# Patient Record
Sex: FEMALE | Race: WHITE
[De-identification: names, ages, dates, MRNs, and addresses within clinical notes are randomized per-mention and may not be internally consistent; named-entity substitution may affect disease eponyms.]

---

## 2019-12-06 ENCOUNTER — HOSPITAL ENCOUNTER (INPATIENT)
Dept: HOSPITAL 94 - ER | Age: 69
LOS: 3 days | Discharge: HOME | DRG: 373 | End: 2019-12-09
Attending: FAMILY MEDICINE | Admitting: HOSPITALIST
Payer: MEDICARE

## 2019-12-06 VITALS — WEIGHT: 250.53 LBS | HEIGHT: 63 IN | BODY MASS INDEX: 44.39 KG/M2

## 2019-12-06 VITALS — DIASTOLIC BLOOD PRESSURE: 55 MMHG | SYSTOLIC BLOOD PRESSURE: 146 MMHG

## 2019-12-06 VITALS — SYSTOLIC BLOOD PRESSURE: 156 MMHG | DIASTOLIC BLOOD PRESSURE: 60 MMHG

## 2019-12-06 DIAGNOSIS — Z90.49: ICD-10-CM

## 2019-12-06 DIAGNOSIS — E11.9: ICD-10-CM

## 2019-12-06 DIAGNOSIS — Z79.899: ICD-10-CM

## 2019-12-06 DIAGNOSIS — I10: ICD-10-CM

## 2019-12-06 DIAGNOSIS — D69.6: ICD-10-CM

## 2019-12-06 DIAGNOSIS — E87.6: ICD-10-CM

## 2019-12-06 DIAGNOSIS — E78.5: ICD-10-CM

## 2019-12-06 DIAGNOSIS — F32.9: ICD-10-CM

## 2019-12-06 DIAGNOSIS — K21.9: ICD-10-CM

## 2019-12-06 DIAGNOSIS — A04.72: Primary | ICD-10-CM

## 2019-12-06 DIAGNOSIS — E86.0: ICD-10-CM

## 2019-12-06 DIAGNOSIS — Z88.8: ICD-10-CM

## 2019-12-06 DIAGNOSIS — K76.0: ICD-10-CM

## 2019-12-06 DIAGNOSIS — Z79.82: ICD-10-CM

## 2019-12-06 DIAGNOSIS — K74.60: ICD-10-CM

## 2019-12-06 LAB
ALBUMIN SERPL BCP-MCNC: 3.2 G/DL (ref 3.4–5)
ALBUMIN/GLOB SERPL: 1 {RATIO} (ref 1.1–1.5)
ALP SERPL-CCNC: 107 IU/L (ref 46–116)
ALT SERPL W P-5'-P-CCNC: 19 U/L (ref 12–78)
ANION GAP SERPL CALCULATED.3IONS-SCNC: 7 MMOL/L (ref 8–16)
AST SERPL W P-5'-P-CCNC: 21 U/L (ref 10–37)
BACTERIA URNS QL MICRO: (no result) /HPF
BASOPHILS # BLD AUTO: 0 X10'3 (ref 0–0.2)
BASOPHILS NFR BLD AUTO: 0.2 % (ref 0–1)
BILIRUB SERPL-MCNC: 1.1 MG/DL (ref 0.1–1)
BUN SERPL-MCNC: 10 MG/DL (ref 7–18)
BUN/CREAT SERPL: 20.4 (ref 6.6–38)
C DIFF SPECIMEN=DIARRHEA?: (no result)
C DIFF TOX A+B STL QL IA: POSITIVE
C DIFF TOX A+B STL QL: POSITIVE
CALCIUM SERPL-MCNC: 8.4 MG/DL (ref 8.5–10.1)
CHLORIDE SERPL-SCNC: 105 MMOL/L (ref 99–107)
CLARITY UR: (no result)
CO2 SERPL-SCNC: 30.9 MMOL/L (ref 24–32)
COARSE GRAN CASTS URNS QL MICRO: (no result) /LPF
COLOR UR: YELLOW
CREAT SERPL-MCNC: 0.49 MG/DL (ref 0.4–0.9)
DEPRECATED SQUAMOUS URNS QL MICRO: (no result) /LPF
EOSINOPHIL # BLD AUTO: 0 X10'3 (ref 0–0.9)
EOSINOPHIL NFR BLD AUTO: 0.4 % (ref 0–6)
ERYTHROCYTE [DISTWIDTH] IN BLOOD BY AUTOMATED COUNT: 15.1 % (ref 11.5–14.5)
GFR SERPL CREATININE-BSD FRML MDRD: > 90 ML/MIN
GLUCOSE SERPL-MCNC: 133 MG/DL (ref 70–104)
GLUCOSE UR STRIP-MCNC: NEGATIVE MG/DL
HCT VFR BLD AUTO: 41.5 % (ref 35–45)
HGB BLD-MCNC: 14.1 G/DL (ref 12–16)
HGB UR QL STRIP: (no result)
KETONES UR STRIP-MCNC: NEGATIVE MG/DL
LEUKOCYTE ESTERASE UR QL STRIP: NEGATIVE
LIPASE SERPL-CCNC: 72 U/L (ref 73–393)
LYMPHOCYTES # BLD AUTO: 0.8 X10'3 (ref 1.1–4.8)
LYMPHOCYTES NFR BLD AUTO: 8.6 % (ref 21–51)
MCH RBC QN AUTO: 29.2 PG (ref 27–31)
MCHC RBC AUTO-ENTMCNC: 34 G/DL (ref 33–36.5)
MCV RBC AUTO: 85.6 FL (ref 78–98)
MONOCYTES # BLD AUTO: 0.6 X10'3 (ref 0–0.9)
MONOCYTES NFR BLD AUTO: 5.9 % (ref 2–12)
MUCOUS THREADS URNS QL MICRO: (no result) /LPF
NEUTROPHILS # BLD AUTO: 7.9 X10'3 (ref 1.8–7.7)
NEUTROPHILS NFR BLD AUTO: 84.9 % (ref 42–75)
NITRITE UR QL STRIP: NEGATIVE
PH UR STRIP: 6 [PH] (ref 4.8–8)
PLATELET # BLD AUTO: 73 X10'3 (ref 140–440)
PMV BLD AUTO: 9.3 FL (ref 7.4–10.4)
POTASSIUM SERPL-SCNC: 2.7 MMOL/L (ref 3.5–5.1)
PROT SERPL-MCNC: 6.4 G/DL (ref 6.4–8.2)
PROT UR QL STRIP: 30 MG/DL
RBC # BLD AUTO: 4.85 X10'6 (ref 4.2–5.6)
RBC #/AREA URNS HPF: (no result) /HPF (ref 0–2)
SODIUM SERPL-SCNC: 143 MMOL/L (ref 135–145)
SP GR UR STRIP: 1.02 (ref 1–1.03)
TRANS CELLS URNS QL MICRO: (no result) /HPF
URN COLLECT METHOD CLASS: (no result)
UROBILINOGEN UR STRIP-MCNC: 1 E.U/DL (ref 0.2–1)
WBC # BLD AUTO: 9.3 X10'3 (ref 4.5–11)
WBC #/AREA URNS HPF: (no result) /HPF (ref 0–4)

## 2019-12-06 PROCEDURE — 83735 ASSAY OF MAGNESIUM: CPT

## 2019-12-06 PROCEDURE — 87449 NOS EACH ORGANISM AG IA: CPT

## 2019-12-06 PROCEDURE — 82272 OCCULT BLD FECES 1-3 TESTS: CPT

## 2019-12-06 PROCEDURE — 80048 BASIC METABOLIC PNL TOTAL CA: CPT

## 2019-12-06 PROCEDURE — 86900 BLOOD TYPING SEROLOGIC ABO: CPT

## 2019-12-06 PROCEDURE — 86901 BLOOD TYPING SEROLOGIC RH(D): CPT

## 2019-12-06 PROCEDURE — 85610 PROTHROMBIN TIME: CPT

## 2019-12-06 PROCEDURE — 82948 REAGENT STRIP/BLOOD GLUCOSE: CPT

## 2019-12-06 PROCEDURE — 86885 COOMBS TEST INDIRECT QUAL: CPT

## 2019-12-06 PROCEDURE — 84132 ASSAY OF SERUM POTASSIUM: CPT

## 2019-12-06 PROCEDURE — 87081 CULTURE SCREEN ONLY: CPT

## 2019-12-06 PROCEDURE — 99285 EMERGENCY DEPT VISIT HI MDM: CPT

## 2019-12-06 PROCEDURE — 83036 HEMOGLOBIN GLYCOSYLATED A1C: CPT

## 2019-12-06 PROCEDURE — 36415 COLL VENOUS BLD VENIPUNCTURE: CPT

## 2019-12-06 PROCEDURE — 87324 CLOSTRIDIUM AG IA: CPT

## 2019-12-06 PROCEDURE — 85025 COMPLETE CBC W/AUTO DIFF WBC: CPT

## 2019-12-06 PROCEDURE — 83690 ASSAY OF LIPASE: CPT

## 2019-12-06 PROCEDURE — 96365 THER/PROPH/DIAG IV INF INIT: CPT

## 2019-12-06 PROCEDURE — 81001 URINALYSIS AUTO W/SCOPE: CPT

## 2019-12-06 PROCEDURE — 80053 COMPREHEN METABOLIC PANEL: CPT

## 2019-12-06 RX ADMIN — SODIUM CHLORIDE SCH MLS/HR: 9 INJECTION INTRAMUSCULAR; INTRAVENOUS; SUBCUTANEOUS at 13:32

## 2019-12-06 RX ADMIN — POTASSIUM CHLORIDE PRN MEQ: 1500 TABLET, EXTENDED RELEASE ORAL at 15:22

## 2019-12-06 RX ADMIN — VANCOMYCIN HYDROCHLORIDE SCH MG: 500 INJECTION, POWDER, LYOPHILIZED, FOR SOLUTION INTRAVENOUS at 20:04

## 2019-12-06 RX ADMIN — INSULIN GLARGINE SCH UNIT: 100 INJECTION, SOLUTION SUBCUTANEOUS at 21:00

## 2019-12-06 RX ADMIN — VANCOMYCIN HYDROCHLORIDE SCH MG: 500 INJECTION, POWDER, LYOPHILIZED, FOR SOLUTION INTRAVENOUS at 16:33

## 2019-12-06 RX ADMIN — VANCOMYCIN HYDROCHLORIDE SCH MG: 500 INJECTION, POWDER, LYOPHILIZED, FOR SOLUTION INTRAVENOUS at 14:50

## 2019-12-06 RX ADMIN — POTASSIUM CHLORIDE PRN MEQ: 1500 TABLET, EXTENDED RELEASE ORAL at 22:20

## 2019-12-06 NOTE — NUR
Problems reprioritized. Patient report given, questions answered & plan of care reviewed 
with Gabriel HENDRICKSON.

## 2019-12-07 VITALS — DIASTOLIC BLOOD PRESSURE: 63 MMHG | SYSTOLIC BLOOD PRESSURE: 146 MMHG

## 2019-12-07 VITALS — DIASTOLIC BLOOD PRESSURE: 58 MMHG | SYSTOLIC BLOOD PRESSURE: 133 MMHG

## 2019-12-07 VITALS — DIASTOLIC BLOOD PRESSURE: 65 MMHG | SYSTOLIC BLOOD PRESSURE: 149 MMHG

## 2019-12-07 LAB
ALBUMIN SERPL BCP-MCNC: 2.8 G/DL (ref 3.4–5)
ANION GAP SERPL CALCULATED.3IONS-SCNC: 11 MMOL/L (ref 8–16)
BASOPHILS # BLD AUTO: 0 X10'3 (ref 0–0.2)
BASOPHILS NFR BLD AUTO: 0.1 % (ref 0–1)
BUN SERPL-MCNC: 11 MG/DL (ref 7–18)
BUN/CREAT SERPL: 26.8 (ref 6.6–38)
CALCIUM SERPL-MCNC: 8.2 MG/DL (ref 8.5–10.1)
CHLORIDE SERPL-SCNC: 107 MMOL/L (ref 99–107)
CO2 SERPL-SCNC: 24.2 MMOL/L (ref 24–32)
CREAT SERPL-MCNC: 0.41 MG/DL (ref 0.4–0.9)
EOSINOPHIL # BLD AUTO: 0 X10'3 (ref 0–0.9)
EOSINOPHIL NFR BLD AUTO: 0.6 % (ref 0–6)
ERYTHROCYTE [DISTWIDTH] IN BLOOD BY AUTOMATED COUNT: 15 % (ref 11.5–14.5)
GFR SERPL CREATININE-BSD FRML MDRD: > 90 ML/MIN
GLUCOSE SERPL-MCNC: 230 MG/DL (ref 70–104)
HBA1C MFR BLD: 8 % (ref 4.5–6.2)
HCT VFR BLD AUTO: 38.8 % (ref 35–45)
HEMOCCULT STL QL: POSITIVE
HGB BLD-MCNC: 13.3 G/DL (ref 12–16)
LYMPHOCYTES # BLD AUTO: 0.7 X10'3 (ref 1.1–4.8)
LYMPHOCYTES NFR BLD AUTO: 12.1 % (ref 21–51)
MAGNESIUM SERPL-MCNC: 1.7 MG/DL (ref 1.5–2.4)
MCH RBC QN AUTO: 29.6 PG (ref 27–31)
MCHC RBC AUTO-ENTMCNC: 34.2 G/DL (ref 33–36.5)
MCV RBC AUTO: 86.7 FL (ref 78–98)
MONOCYTES # BLD AUTO: 0.4 X10'3 (ref 0–0.9)
MONOCYTES NFR BLD AUTO: 7.2 % (ref 2–12)
NEUTROPHILS # BLD AUTO: 4.9 X10'3 (ref 1.8–7.7)
NEUTROPHILS NFR BLD AUTO: 80 % (ref 42–75)
PLATELET # BLD AUTO: 59 X10'3 (ref 140–440)
PMV BLD AUTO: 9.3 FL (ref 7.4–10.4)
POTASSIUM SERPL-SCNC: 2.9 MMOL/L (ref 3.5–5.1)
RBC # BLD AUTO: 4.47 X10'6 (ref 4.2–5.6)
SODIUM SERPL-SCNC: 142 MMOL/L (ref 135–145)
WBC # BLD AUTO: 6.1 X10'3 (ref 4.5–11)

## 2019-12-07 RX ADMIN — VANCOMYCIN HYDROCHLORIDE SCH MG: 500 INJECTION, POWDER, LYOPHILIZED, FOR SOLUTION INTRAVENOUS at 20:08

## 2019-12-07 RX ADMIN — SODIUM CHLORIDE SCH MLS/HR: 9 INJECTION INTRAMUSCULAR; INTRAVENOUS; SUBCUTANEOUS at 01:28

## 2019-12-07 RX ADMIN — POTASSIUM CHLORIDE PRN MEQ: 1500 TABLET, EXTENDED RELEASE ORAL at 07:12

## 2019-12-07 RX ADMIN — INSULIN LISPRO SCH UNITS: 100 INJECTION, SOLUTION INTRAVENOUS; SUBCUTANEOUS at 09:14

## 2019-12-07 RX ADMIN — VANCOMYCIN HYDROCHLORIDE SCH MG: 500 INJECTION, POWDER, LYOPHILIZED, FOR SOLUTION INTRAVENOUS at 07:10

## 2019-12-07 RX ADMIN — VANCOMYCIN HYDROCHLORIDE SCH MG: 500 INJECTION, POWDER, LYOPHILIZED, FOR SOLUTION INTRAVENOUS at 12:49

## 2019-12-07 RX ADMIN — SODIUM CHLORIDE SCH MLS/HR: 9 INJECTION INTRAMUSCULAR; INTRAVENOUS; SUBCUTANEOUS at 10:31

## 2019-12-07 RX ADMIN — PANTOPRAZOLE SODIUM SCH MG: 40 TABLET, DELAYED RELEASE ORAL at 07:11

## 2019-12-07 RX ADMIN — POTASSIUM CHLORIDE PRN MEQ: 1500 TABLET, EXTENDED RELEASE ORAL at 12:49

## 2019-12-07 RX ADMIN — INSULIN LISPRO SCH UNITS: 100 INJECTION, SOLUTION INTRAVENOUS; SUBCUTANEOUS at 13:57

## 2019-12-07 RX ADMIN — INSULIN GLARGINE SCH UNIT: 100 INJECTION, SOLUTION SUBCUTANEOUS at 20:31

## 2019-12-07 RX ADMIN — POTASSIUM CHLORIDE PRN MEQ: 1500 TABLET, EXTENDED RELEASE ORAL at 16:11

## 2019-12-07 RX ADMIN — INSULIN LISPRO SCH UNITS: 100 INJECTION, SOLUTION INTRAVENOUS; SUBCUTANEOUS at 20:33

## 2019-12-07 RX ADMIN — VANCOMYCIN HYDROCHLORIDE SCH MG: 500 INJECTION, POWDER, LYOPHILIZED, FOR SOLUTION INTRAVENOUS at 16:11

## 2019-12-07 NOTE — NUR
Nutrition consult received re: low residue diet/diarrhea/diabetes. Noted 

that patient is already receiving carb controlled, low residue diet. Her 

HgA1c is 8 and Blood Glucose ranging from 110-261 mg/dl. 

Per MD note patient has acute C.diff with active diarrhea, had 11 liquid 

bowel movements today. She has increased protein and hydrations needs d/t 

C.diff infection. She will need written high protein education handout 

with verbal review and written DM education handout with verbal review and 

referral to outpatient DM education class. Will provide written education 

when C.diff is cleared. RD will meet with patient to provide verbal 

education. Current appetite is great, eating % of meals and is 

meeting needs using IBW.   



Recommend: 

1. continue carb controlled, low residue diet

2. patient needs written DM and written high protein education handout 

3. wt per rx

-------------------------------------------------------------------------------

Addendum: 12/07/19 at 1952 by Marisol Ann RD

-------------------------------------------------------------------------------

Amended: Links added.

## 2019-12-07 NOTE — NUR
Problems reprioritized. Patient report given, questions answered & plan of care reviewed 
with Tamy HENDRICKSON.

## 2019-12-07 NOTE — NUR
MD SHEA MADE AWARE OF POTASSIUM LEVEL, PLATELET LEVEL, POSITIVE STOOL OCCULT, AND LOW 
GRADE FEVER. ONE TIME ONLY DOSE OF MAGNESIUM ORDERED FOR POTASSIUM ABSORPTION.

## 2019-12-07 NOTE — NUR
Problems reprioritized. Patient report given, questions answered & plan of care reviewed 
with Kim HENDRICKSON. Pt. has no needs at this time.

## 2019-12-08 VITALS — SYSTOLIC BLOOD PRESSURE: 154 MMHG | DIASTOLIC BLOOD PRESSURE: 61 MMHG

## 2019-12-08 VITALS — DIASTOLIC BLOOD PRESSURE: 70 MMHG | SYSTOLIC BLOOD PRESSURE: 124 MMHG

## 2019-12-08 VITALS — DIASTOLIC BLOOD PRESSURE: 53 MMHG | SYSTOLIC BLOOD PRESSURE: 128 MMHG

## 2019-12-08 VITALS — SYSTOLIC BLOOD PRESSURE: 125 MMHG | DIASTOLIC BLOOD PRESSURE: 71 MMHG

## 2019-12-08 LAB
ALBUMIN SERPL BCP-MCNC: 2.6 G/DL (ref 3.4–5)
ANION GAP SERPL CALCULATED.3IONS-SCNC: 8 MMOL/L (ref 8–16)
BASOPHILS # BLD AUTO: 0 X10'3 (ref 0–0.2)
BASOPHILS NFR BLD AUTO: 0.1 % (ref 0–1)
BUN SERPL-MCNC: 8 MG/DL (ref 7–18)
BUN/CREAT SERPL: 20.5 (ref 6.6–38)
CALCIUM SERPL-MCNC: 8 MG/DL (ref 8.5–10.1)
CHLORIDE SERPL-SCNC: 109 MMOL/L (ref 99–107)
CO2 SERPL-SCNC: 25.9 MMOL/L (ref 24–32)
CREAT SERPL-MCNC: 0.39 MG/DL (ref 0.4–0.9)
EOSINOPHIL # BLD AUTO: 0.1 X10'3 (ref 0–0.9)
EOSINOPHIL NFR BLD AUTO: 1.3 % (ref 0–6)
ERYTHROCYTE [DISTWIDTH] IN BLOOD BY AUTOMATED COUNT: 15.4 % (ref 11.5–14.5)
GFR SERPL CREATININE-BSD FRML MDRD: > 90 ML/MIN
GLUCOSE SERPL-MCNC: 268 MG/DL (ref 70–104)
HCT VFR BLD AUTO: 36.9 % (ref 35–45)
HGB BLD-MCNC: 12.6 G/DL (ref 12–16)
LYMPHOCYTES # BLD AUTO: 0.7 X10'3 (ref 1.1–4.8)
LYMPHOCYTES NFR BLD AUTO: 18 % (ref 21–51)
MAGNESIUM SERPL-MCNC: 1.8 MG/DL (ref 1.5–2.4)
MCH RBC QN AUTO: 29.5 PG (ref 27–31)
MCHC RBC AUTO-ENTMCNC: 34.2 G/DL (ref 33–36.5)
MCV RBC AUTO: 86 FL (ref 78–98)
MONOCYTES # BLD AUTO: 0.3 X10'3 (ref 0–0.9)
MONOCYTES NFR BLD AUTO: 8.3 % (ref 2–12)
NEUTROPHILS # BLD AUTO: 3 X10'3 (ref 1.8–7.7)
NEUTROPHILS NFR BLD AUTO: 72.3 % (ref 42–75)
PLATELET # BLD AUTO: 58 X10'3 (ref 140–440)
PMV BLD AUTO: 9.4 FL (ref 7.4–10.4)
POTASSIUM SERPL-SCNC: 3.4 MMOL/L (ref 3.5–5.1)
RBC # BLD AUTO: 4.29 X10'6 (ref 4.2–5.6)
SODIUM SERPL-SCNC: 143 MMOL/L (ref 135–145)
WBC # BLD AUTO: 4.1 X10'3 (ref 4.5–11)

## 2019-12-08 RX ADMIN — INSULIN GLARGINE SCH UNIT: 100 INJECTION, SOLUTION SUBCUTANEOUS at 21:37

## 2019-12-08 RX ADMIN — POTASSIUM CHLORIDE PRN MEQ: 1500 TABLET, EXTENDED RELEASE ORAL at 14:05

## 2019-12-08 RX ADMIN — PANTOPRAZOLE SODIUM SCH MG: 40 TABLET, DELAYED RELEASE ORAL at 09:23

## 2019-12-08 RX ADMIN — INSULIN LISPRO SCH UNITS: 100 INJECTION, SOLUTION INTRAVENOUS; SUBCUTANEOUS at 14:12

## 2019-12-08 RX ADMIN — POTASSIUM CHLORIDE PRN MEQ: 1500 TABLET, EXTENDED RELEASE ORAL at 09:23

## 2019-12-08 RX ADMIN — INSULIN LISPRO SCH UNITS: 100 INJECTION, SOLUTION INTRAVENOUS; SUBCUTANEOUS at 09:32

## 2019-12-08 RX ADMIN — POTASSIUM CHLORIDE PRN MEQ: 1500 TABLET, EXTENDED RELEASE ORAL at 17:52

## 2019-12-08 RX ADMIN — VANCOMYCIN HYDROCHLORIDE SCH MG: 500 INJECTION, POWDER, LYOPHILIZED, FOR SOLUTION INTRAVENOUS at 17:52

## 2019-12-08 RX ADMIN — VANCOMYCIN HYDROCHLORIDE SCH MG: 500 INJECTION, POWDER, LYOPHILIZED, FOR SOLUTION INTRAVENOUS at 21:34

## 2019-12-08 RX ADMIN — INSULIN LISPRO SCH UNITS: 100 INJECTION, SOLUTION INTRAVENOUS; SUBCUTANEOUS at 19:25

## 2019-12-08 RX ADMIN — VANCOMYCIN HYDROCHLORIDE SCH MG: 500 INJECTION, POWDER, LYOPHILIZED, FOR SOLUTION INTRAVENOUS at 09:24

## 2019-12-08 RX ADMIN — VANCOMYCIN HYDROCHLORIDE SCH MG: 500 INJECTION, POWDER, LYOPHILIZED, FOR SOLUTION INTRAVENOUS at 14:05

## 2019-12-08 NOTE — NUR
Problems reprioritized. Patient report given, questions answered & plan of care reviewed 
with Tamy HENDRICKSON. 

-------------------------------------------------------------------------------

Addendum: 12/08/19 at 0609 by Kim Mulligan RN

-------------------------------------------------------------------------------

Amended: Links added.

## 2019-12-08 NOTE — NUR
Pt. removed her own rectal tube stating "it came out of place". Tube still inflated. No 
damage to rectal area seen, no blood noted. MD King aware. Pt. would prefer that another 
rectal tube NOT be placed. Provided with briefs to support any incontinence.

## 2019-12-08 NOTE — NUR
Problems reprioritized. Patient report given, questions answered & plan of care reviewed 
with Dc HENDRICKSON. Pt. has no needs at this time, eating dinner in room awake and alert, able to 
communicate.

## 2019-12-09 VITALS — DIASTOLIC BLOOD PRESSURE: 63 MMHG | SYSTOLIC BLOOD PRESSURE: 136 MMHG

## 2019-12-09 VITALS — DIASTOLIC BLOOD PRESSURE: 59 MMHG | SYSTOLIC BLOOD PRESSURE: 105 MMHG

## 2019-12-09 VITALS — DIASTOLIC BLOOD PRESSURE: 58 MMHG | SYSTOLIC BLOOD PRESSURE: 140 MMHG

## 2019-12-09 LAB
ALBUMIN SERPL BCP-MCNC: 2.6 G/DL (ref 3.4–5)
ANION GAP SERPL CALCULATED.3IONS-SCNC: 7 MMOL/L (ref 8–16)
BASOPHILS # BLD AUTO: 0 X10'3 (ref 0–0.2)
BASOPHILS NFR BLD AUTO: 0.3 % (ref 0–1)
BUN SERPL-MCNC: 7 MG/DL (ref 7–18)
BUN/CREAT SERPL: 15.2 (ref 6.6–38)
CALCIUM SERPL-MCNC: 8.3 MG/DL (ref 8.5–10.1)
CHLORIDE SERPL-SCNC: 111 MMOL/L (ref 99–107)
CO2 SERPL-SCNC: 27.7 MMOL/L (ref 24–32)
CREAT SERPL-MCNC: 0.46 MG/DL (ref 0.4–0.9)
EOSINOPHIL # BLD AUTO: 0.1 X10'3 (ref 0–0.9)
EOSINOPHIL NFR BLD AUTO: 1.6 % (ref 0–6)
ERYTHROCYTE [DISTWIDTH] IN BLOOD BY AUTOMATED COUNT: 14.8 % (ref 11.5–14.5)
GFR SERPL CREATININE-BSD FRML MDRD: > 90 ML/MIN
GLUCOSE SERPL-MCNC: 152 MG/DL (ref 70–104)
HCT VFR BLD AUTO: 35.8 % (ref 35–45)
HGB BLD-MCNC: 12.2 G/DL (ref 12–16)
LYMPHOCYTES # BLD AUTO: 0.9 X10'3 (ref 1.1–4.8)
LYMPHOCYTES NFR BLD AUTO: 27.1 % (ref 21–51)
MAGNESIUM SERPL-MCNC: 1.7 MG/DL (ref 1.5–2.4)
MCH RBC QN AUTO: 29.5 PG (ref 27–31)
MCHC RBC AUTO-ENTMCNC: 34.1 G/DL (ref 33–36.5)
MCV RBC AUTO: 86.4 FL (ref 78–98)
MONOCYTES # BLD AUTO: 0.3 X10'3 (ref 0–0.9)
MONOCYTES NFR BLD AUTO: 10.2 % (ref 2–12)
NEUTROPHILS # BLD AUTO: 2 X10'3 (ref 1.8–7.7)
NEUTROPHILS NFR BLD AUTO: 60.8 % (ref 42–75)
PLATELET # BLD AUTO: 65 X10'3 (ref 140–440)
PMV BLD AUTO: 9.7 FL (ref 7.4–10.4)
POTASSIUM SERPL-SCNC: 3.5 MMOL/L (ref 3.5–5.1)
RBC # BLD AUTO: 4.14 X10'6 (ref 4.2–5.6)
SODIUM SERPL-SCNC: 146 MMOL/L (ref 135–145)
WBC # BLD AUTO: 3.3 X10'3 (ref 4.5–11)

## 2019-12-09 RX ADMIN — INSULIN LISPRO SCH UNITS: 100 INJECTION, SOLUTION INTRAVENOUS; SUBCUTANEOUS at 13:53

## 2019-12-09 RX ADMIN — PANTOPRAZOLE SODIUM SCH MG: 40 TABLET, DELAYED RELEASE ORAL at 07:30

## 2019-12-09 RX ADMIN — VANCOMYCIN HYDROCHLORIDE SCH MG: 500 INJECTION, POWDER, LYOPHILIZED, FOR SOLUTION INTRAVENOUS at 08:18

## 2019-12-09 RX ADMIN — VANCOMYCIN HYDROCHLORIDE SCH MG: 500 INJECTION, POWDER, LYOPHILIZED, FOR SOLUTION INTRAVENOUS at 13:50

## 2019-12-09 RX ADMIN — VANCOMYCIN HYDROCHLORIDE SCH MG: 500 INJECTION, POWDER, LYOPHILIZED, FOR SOLUTION INTRAVENOUS at 16:58

## 2019-12-09 RX ADMIN — INSULIN LISPRO SCH UNITS: 100 INJECTION, SOLUTION INTRAVENOUS; SUBCUTANEOUS at 08:20

## 2019-12-09 NOTE — NUR
F/u: Pt % meals meeting protein needs this admit. Pt seen by WILLIAM for written/verbal DM 
ed w/ RD contact information provided. Pt declines additional DM concerns at this time.

-------------------------------------------------------------------------------

Addendum: 12/09/19 at 1527 by Lion Yost RD

-------------------------------------------------------------------------------

Amended: Links added.

## 2019-12-09 NOTE — NUR
Discharged patient home accompanied by her family. Discharge instructions given to patient, 
patient verbalized understanding of all instructions made. Peripheral IV catheter removed, 
tip intact. A1C and diabetes survival skills information as well as information about C. 
diff discussed with patient and family at bedside. Prescription for Vanco PO called in by 
Jd sheppard [facility] pharmacy at [facility] Instructed patient to ensure she has all her belongings 
with her. Night time dose of Vanco PO sent with patient, hospital pharmacy staff said that I 
could sent the night time dose of Vanco PO with the patient upon discharge.

## 2019-12-09 NOTE — NUR
Patient in room [facility] 341. I have received report from  Dc HENDRICKSON  and had the opportunity to 
ask questions and assume patient care.

## 2019-12-09 NOTE — NUR
Problems reprioritized. Patient report given, questions answered & plan of care reviewed 
with MITCHELL. 

-------------------------------------------------------------------------------

Addendum: 12/09/19 at 0646 by Rylan Aviles RN

-------------------------------------------------------------------------------

Amended: Links added.

## 2023-04-13 ENCOUNTER — HOSPITAL ENCOUNTER (EMERGENCY)
Dept: HOSPITAL 94 - ER | Age: 73
Discharge: HOME | End: 2023-04-13
Payer: MEDICARE

## 2023-04-13 VITALS — HEIGHT: 63 IN | WEIGHT: 265.55 LBS | BODY MASS INDEX: 47.05 KG/M2

## 2023-04-13 VITALS — DIASTOLIC BLOOD PRESSURE: 69 MMHG | SYSTOLIC BLOOD PRESSURE: 136 MMHG

## 2023-04-13 DIAGNOSIS — Z88.1: ICD-10-CM

## 2023-04-13 DIAGNOSIS — Z88.8: ICD-10-CM

## 2023-04-13 DIAGNOSIS — Z79.84: ICD-10-CM

## 2023-04-13 DIAGNOSIS — N93.8: Primary | ICD-10-CM

## 2023-04-13 DIAGNOSIS — Z79.899: ICD-10-CM

## 2023-04-13 DIAGNOSIS — I10: ICD-10-CM

## 2023-04-13 DIAGNOSIS — E11.9: ICD-10-CM

## 2023-04-13 DIAGNOSIS — Z79.1: ICD-10-CM

## 2023-04-13 LAB
ALBUMIN SERPL BCP-MCNC: 3.1 G/DL (ref 3.4–5)
ALBUMIN/GLOB SERPL: 1 {RATIO} (ref 1.1–1.5)
ALP SERPL-CCNC: 123 IU/L (ref 46–116)
ALT SERPL W P-5'-P-CCNC: 29 U/L (ref 12–78)
ANION GAP SERPL CALCULATED.3IONS-SCNC: 6 MMOL/L (ref 8–16)
AST SERPL W P-5'-P-CCNC: 28 U/L (ref 10–37)
BASOPHILS # BLD AUTO: 0.1 X10'3 (ref 0–0.2)
BASOPHILS NFR BLD AUTO: 1.6 % (ref 0–1)
BILIRUB SERPL-MCNC: 0.8 MG/DL (ref 0.1–1)
BUN SERPL-MCNC: 13 MG/DL (ref 7–18)
BUN/CREAT SERPL: 33.3 (ref 10–20)
CALCIUM SERPL-MCNC: 8.7 MG/DL (ref 8.5–10.1)
CHLORIDE SERPL-SCNC: 110 MMOL/L (ref 99–107)
CO2 SERPL-SCNC: 29.6 MMOL/L (ref 24–32)
CREAT SERPL-MCNC: 0.39 MG/DL (ref 0.4–0.9)
EOSINOPHIL # BLD AUTO: 0.2 X10'3 (ref 0–0.9)
EOSINOPHIL NFR BLD AUTO: 4.8 % (ref 0–6)
ERYTHROCYTE [DISTWIDTH] IN BLOOD BY AUTOMATED COUNT: 15.6 % (ref 11.5–14.5)
ETHANOL SERPL-MCNC: < 0.01 GM/DL (ref 0–0.01)
GFR SERPL CREATININE-BSD FRML MDRD: > 90 ML/MIN
GLUCOSE SERPL-MCNC: 179 MG/DL (ref 70–104)
HCT VFR BLD AUTO: 37.7 % (ref 35–45)
HGB BLD-MCNC: 12.5 G/DL (ref 12–16)
LYMPHOCYTES # BLD AUTO: 0.5 X10'3 (ref 1.1–4.8)
LYMPHOCYTES NFR BLD AUTO: 11.1 % (ref 21–51)
MCH RBC QN AUTO: 28.7 PG (ref 27–31)
MCHC RBC AUTO-ENTMCNC: 33.1 G/DL (ref 33–36.5)
MCV RBC AUTO: 86.9 FL (ref 78–98)
MONOCYTES # BLD AUTO: 0.3 X10'3 (ref 0–0.9)
MONOCYTES NFR BLD AUTO: 6.1 % (ref 2–12)
NEUTROPHILS # BLD AUTO: 3.6 X10'3 (ref 1.8–7.7)
NEUTROPHILS NFR BLD AUTO: 76.4 % (ref 42–75)
PLATELET # BLD AUTO: 53 X10'3 (ref 140–440)
PMV BLD AUTO: 8.7 FL (ref 7.4–10.4)
POTASSIUM SERPL-SCNC: 3.7 MMOL/L (ref 3.5–5.1)
PROT SERPL-MCNC: 6.1 G/DL (ref 6.4–8.2)
RBC # BLD AUTO: 4.33 X10'6 (ref 4.2–5.6)
SODIUM SERPL-SCNC: 146 MMOL/L (ref 135–145)
WBC # BLD AUTO: 4.7 X10'3 (ref 4.5–11)

## 2023-04-13 PROCEDURE — 85610 PROTHROMBIN TIME: CPT

## 2023-04-13 PROCEDURE — 76856 US EXAM PELVIC COMPLETE: CPT

## 2023-04-13 PROCEDURE — 86900 BLOOD TYPING SEROLOGIC ABO: CPT

## 2023-04-13 PROCEDURE — 86885 COOMBS TEST INDIRECT QUAL: CPT

## 2023-04-13 PROCEDURE — 80320 DRUG SCREEN QUANTALCOHOLS: CPT

## 2023-04-13 PROCEDURE — 86901 BLOOD TYPING SEROLOGIC RH(D): CPT

## 2023-04-13 PROCEDURE — 85025 COMPLETE CBC W/AUTO DIFF WBC: CPT

## 2023-04-13 PROCEDURE — 80053 COMPREHEN METABOLIC PANEL: CPT

## 2023-04-13 PROCEDURE — 36415 COLL VENOUS BLD VENIPUNCTURE: CPT

## 2023-04-13 PROCEDURE — 99284 EMERGENCY DEPT VISIT MOD MDM: CPT

## 2023-08-30 ENCOUNTER — HOSPITAL ENCOUNTER (INPATIENT)
Dept: HOSPITAL 94 - ER | Age: 73
LOS: 3 days | Discharge: HOME | DRG: 372 | End: 2023-09-02
Attending: FAMILY MEDICINE | Admitting: FAMILY MEDICINE
Payer: MEDICARE

## 2023-08-30 VITALS — BODY MASS INDEX: 42.89 KG/M2 | WEIGHT: 242.07 LBS | HEIGHT: 63 IN

## 2023-08-30 DIAGNOSIS — Z86.19: ICD-10-CM

## 2023-08-30 DIAGNOSIS — T50.0X5A: ICD-10-CM

## 2023-08-30 DIAGNOSIS — Z90.49: ICD-10-CM

## 2023-08-30 DIAGNOSIS — Z83.3: ICD-10-CM

## 2023-08-30 DIAGNOSIS — E11.9: ICD-10-CM

## 2023-08-30 DIAGNOSIS — Z88.8: ICD-10-CM

## 2023-08-30 DIAGNOSIS — Z90.710: ICD-10-CM

## 2023-08-30 DIAGNOSIS — Z80.9: ICD-10-CM

## 2023-08-30 DIAGNOSIS — K74.60: ICD-10-CM

## 2023-08-30 DIAGNOSIS — E87.6: ICD-10-CM

## 2023-08-30 DIAGNOSIS — N39.0: ICD-10-CM

## 2023-08-30 DIAGNOSIS — A04.72: Primary | ICD-10-CM
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
## 2023-08-30 DIAGNOSIS — Z85.42: ICD-10-CM

## 2023-08-30 DIAGNOSIS — T46.6X5A: ICD-10-CM

## 2023-08-30 DIAGNOSIS — K76.0: ICD-10-CM

## 2023-08-30 DIAGNOSIS — Z79.899: ICD-10-CM

## 2023-08-30 DIAGNOSIS — I10: ICD-10-CM

## 2023-08-30 DIAGNOSIS — Z98.84: ICD-10-CM

## 2023-08-30 DIAGNOSIS — T43.225A: ICD-10-CM

## 2023-08-30 DIAGNOSIS — E78.5: ICD-10-CM

## 2023-08-30 DIAGNOSIS — D50.9: ICD-10-CM

## 2023-08-30 PROCEDURE — 87081 CULTURE SCREEN ONLY: CPT

## 2023-08-30 PROCEDURE — 97530 THERAPEUTIC ACTIVITIES: CPT

## 2023-08-30 PROCEDURE — 84100 ASSAY OF PHOSPHORUS: CPT

## 2023-08-30 PROCEDURE — 87186 SC STD MICRODIL/AGAR DIL: CPT

## 2023-08-30 PROCEDURE — 87449 NOS EACH ORGANISM AG IA: CPT

## 2023-08-30 PROCEDURE — 87324 CLOSTRIDIUM AG IA: CPT

## 2023-08-30 PROCEDURE — 99285 EMERGENCY DEPT VISIT HI MDM: CPT

## 2023-08-30 PROCEDURE — 87088 URINE BACTERIA CULTURE: CPT

## 2023-08-30 PROCEDURE — 82948 REAGENT STRIP/BLOOD GLUCOSE: CPT

## 2023-08-30 PROCEDURE — 97161 PT EVAL LOW COMPLEX 20 MIN: CPT

## 2023-08-30 PROCEDURE — 80053 COMPREHEN METABOLIC PANEL: CPT

## 2023-08-30 PROCEDURE — 85008 BL SMEAR W/O DIFF WBC COUNT: CPT

## 2023-08-30 PROCEDURE — 85025 COMPLETE CBC W/AUTO DIFF WBC: CPT

## 2023-08-30 PROCEDURE — 81001 URINALYSIS AUTO W/SCOPE: CPT

## 2023-08-30 PROCEDURE — 83735 ASSAY OF MAGNESIUM: CPT

## 2023-08-30 PROCEDURE — 84145 PROCALCITONIN (PCT): CPT

## 2023-08-30 PROCEDURE — 83036 HEMOGLOBIN GLYCOSYLATED A1C: CPT

## 2023-08-30 PROCEDURE — 97116 GAIT TRAINING THERAPY: CPT

## 2023-08-30 PROCEDURE — 87040 BLOOD CULTURE FOR BACTERIA: CPT

## 2023-08-30 PROCEDURE — 83605 ASSAY OF LACTIC ACID: CPT

## 2023-08-30 PROCEDURE — 87077 CULTURE AEROBIC IDENTIFY: CPT

## 2023-08-30 PROCEDURE — 84132 ASSAY OF SERUM POTASSIUM: CPT

## 2023-08-30 PROCEDURE — 36415 COLL VENOUS BLD VENIPUNCTURE: CPT

## 2023-08-30 PROCEDURE — 74177 CT ABD & PELVIS W/CONTRAST: CPT

## 2023-08-31 LAB
ALBUMIN SERPL BCP-MCNC: 3 G/DL (ref 3.4–5)
ALBUMIN/GLOB SERPL: 0.9 {RATIO} (ref 1.1–1.5)
ALP SERPL-CCNC: 100 IU/L (ref 46–116)
ALT SERPL W P-5'-P-CCNC: 22 U/L (ref 12–78)
ANION GAP SERPL CALCULATED.3IONS-SCNC: 7 MMOL/L (ref 8–16)
ANISOCYTOSIS BLD QL SMEAR: (no result)
AST SERPL W P-5'-P-CCNC: 24 U/L (ref 10–37)
BACTERIA URNS QL MICRO: (no result) /HPF
BASOPHILS # BLD AUTO: 0 X10'3 (ref 0–0.2)
BASOPHILS NFR BLD AUTO: 0.2 % (ref 0–1)
BILIRUB SERPL-MCNC: 1 MG/DL (ref 0.1–1)
BILIRUB UR QL STRIP: NEGATIVE
BUN SERPL-MCNC: 14 MG/DL (ref 7–18)
BUN/CREAT SERPL: 26.4 (ref 10–20)
C DIFF SPECIMEN=DIARRHEA?: (no result)
C DIFF TOX A+B STL QL IA: POSITIVE
C DIFF TOX A+B STL QL: POSITIVE
CALCIUM SERPL-MCNC: 8.3 MG/DL (ref 8.5–10.1)
CHLORIDE SERPL-SCNC: 106 MMOL/L (ref 99–107)
CLARITY UR: (no result)
CO2 SERPL-SCNC: 27.2 MMOL/L (ref 24–32)
COLOR UR: YELLOW
CREAT CL PREDICTED SERPL C-G-VRATE: 78 ML/MIN
CREAT SERPL-MCNC: 0.53 MG/DL (ref 0.4–0.9)
DEPRECATED SQUAMOUS URNS QL MICRO: (no result) /LPF
EOSINOPHIL # BLD AUTO: 0.1 X10'3 (ref 0–0.9)
EOSINOPHIL NFR BLD AUTO: 1.3 % (ref 0–6)
ERYTHROCYTE [DISTWIDTH] IN BLOOD BY AUTOMATED COUNT: 19.2 % (ref 11.5–14.5)
FINE GRAN CASTS URNS QL MICRO: (no result) /LPF
GFR SERPL CREATININE-BSD FRML MDRD: > 90 ML/MIN
GLOBULIN SER CALC-MCNC: 3.3 G/DL (ref 2.7–4.3)
GLUCOSE SERPL-MCNC: 168 MG/DL (ref 70–104)
GLUCOSE UR STRIP-MCNC: NEGATIVE MG/DL
HCT VFR BLD AUTO: 29.2 % (ref 35–45)
HGB BLD-MCNC: 9.1 G/DL (ref 12–16)
HGB UR QL STRIP: (no result)
KETONES UR STRIP-MCNC: NEGATIVE MG/DL
LEUKOCYTE ESTERASE UR QL STRIP: (no result)
LYMPHOCYTES # BLD AUTO: 0.5 X10'3 (ref 1.1–4.8)
LYMPHOCYTES NFR BLD AUTO: 11.3 % (ref 21–51)
MCH RBC QN AUTO: 22.2 PG (ref 27–31)
MCHC RBC AUTO-ENTMCNC: 31.2 G/DL (ref 33–36.5)
MCV RBC AUTO: 71 FL (ref 78–98)
MICROCYTES BLD QL SMEAR: (no result)
MONOCYTES # BLD AUTO: 0.5 X10'3 (ref 0–0.9)
MONOCYTES NFR BLD AUTO: 12.2 % (ref 2–12)
NEUTROPHILS # BLD AUTO: 3.3 X10'3 (ref 1.8–7.7)
NEUTROPHILS NFR BLD AUTO: 75 % (ref 42–75)
NITRITE UR QL STRIP: POSITIVE
PH UR STRIP: 6 [PH] (ref 4.8–8)
PLATELET # BLD AUTO: 69 X10'3 (ref 140–440)
PLATELET BLD QL SMEAR: (no result)
PMV BLD AUTO: 9.2 FL (ref 7.4–10.4)
POLYCHROMASIA BLD QL SMEAR: (no result)
POTASSIUM SERPL-SCNC: 3.1 MMOL/L (ref 3.5–5.1)
PROT SERPL-MCNC: 6.3 G/DL (ref 6.4–8.2)
PROT UR QL STRIP: (no result) MG/DL
RBC # BLD AUTO: 4.11 X10'6 (ref 4.2–5.6)
RBC #/AREA URNS HPF: (no result) /HPF (ref 0–2)
RBC MORPH BLD: (no result)
SODIUM SERPL-SCNC: 140 MMOL/L (ref 135–145)
SP GR UR STRIP: 1.01 (ref 1–1.03)
TRANS CELLS URNS QL MICRO: (no result) /HPF
URN COLLECT METHOD CLASS: (no result)
UROBILINOGEN UR STRIP-MCNC: 0.2 E.U/DL (ref 0.2–1)
WBC # BLD AUTO: 4.3 X10'3 (ref 4.5–11)
WBC #/AREA URNS HPF: (no result) /HPF (ref 0–4)
WBC CLUMPS #/AREA URNS HPF: (no result) /HPF

## 2023-08-31 PROCEDURE — BW211ZZ COMPUTERIZED TOMOGRAPHY (CT SCAN) OF ABDOMEN AND PELVIS USING LOW OSMOLAR CONTRAST: ICD-10-PCS

## 2023-08-31 RX ADMIN — DOCUSATE SODIUM SCH MG: 100 CAPSULE, LIQUID FILLED ORAL at 19:07

## 2023-08-31 RX ADMIN — SODIUM CHLORIDE AND POTASSIUM CHLORIDE SCH MLS/HR: .9; .15 SOLUTION INTRAVENOUS at 23:05

## 2023-08-31 RX ADMIN — VANCOMYCIN HYDROCHLORIDE SCH MG: 500 INJECTION, POWDER, LYOPHILIZED, FOR SOLUTION INTRAVENOUS at 14:41

## 2023-08-31 RX ADMIN — SODIUM CHLORIDE AND POTASSIUM CHLORIDE SCH MLS/HR: .9; .15 SOLUTION INTRAVENOUS at 14:51

## 2023-08-31 RX ADMIN — VANCOMYCIN HYDROCHLORIDE SCH MG: 500 INJECTION, POWDER, LYOPHILIZED, FOR SOLUTION INTRAVENOUS at 20:00

## 2023-08-31 NOTE — NUR
pt is currently refusing vitals and all iv medications. pt states she wants to 
leave due to having to wait for a bed, being tangled in wires, and fear of 
falling since no one will help her. pt educated on the importance of recieving 
medical attention. pt also given a call light and educated on the use. pt given 
ice chips as requested and pt states she is going to try to get some sleep. dr. ruano notified.

## 2023-08-31 NOTE — NUR
patient refused vitals stated she didn't want to stay anymore and wanted to 
leave. this nurse told patient to hangout for a second and wait for the charge 
nurse to come speak with her. patient stated ok and is sitting on bed currently 
waiting.

## 2023-09-01 VITALS
TEMPERATURE: 98.9 F | OXYGEN SATURATION: 98 % | RESPIRATION RATE: 18 BRPM | HEART RATE: 91 BPM | SYSTOLIC BLOOD PRESSURE: 125 MMHG | DIASTOLIC BLOOD PRESSURE: 45 MMHG

## 2023-09-01 VITALS
OXYGEN SATURATION: 97 % | DIASTOLIC BLOOD PRESSURE: 63 MMHG | TEMPERATURE: 98.2 F | RESPIRATION RATE: 18 BRPM | SYSTOLIC BLOOD PRESSURE: 131 MMHG | HEART RATE: 69 BPM

## 2023-09-01 VITALS
HEART RATE: 97 BPM | DIASTOLIC BLOOD PRESSURE: 40 MMHG | OXYGEN SATURATION: 96 % | SYSTOLIC BLOOD PRESSURE: 110 MMHG | TEMPERATURE: 98.4 F | RESPIRATION RATE: 16 BRPM

## 2023-09-01 VITALS — RESPIRATION RATE: 16 BRPM | OXYGEN SATURATION: 96 %

## 2023-09-01 LAB
ALBUMIN SERPL BCP-MCNC: 2.9 G/DL (ref 3.4–5)
ALBUMIN/GLOB SERPL: 0.9 {RATIO} (ref 1.1–1.5)
ALP SERPL-CCNC: 96 IU/L (ref 46–116)
ALT SERPL W P-5'-P-CCNC: 24 U/L (ref 12–78)
ANION GAP SERPL CALCULATED.3IONS-SCNC: 11 MMOL/L (ref 8–16)
AST SERPL W P-5'-P-CCNC: 23 U/L (ref 10–37)
BASOPHILS # BLD AUTO: 0 X10'3 (ref 0–0.2)
BASOPHILS NFR BLD AUTO: 0.1 % (ref 0–1)
BILIRUB SERPL-MCNC: 1 MG/DL (ref 0.1–1)
BUN SERPL-MCNC: 15 MG/DL (ref 7–18)
BUN/CREAT SERPL: 30 (ref 10–20)
CALCIUM SERPL-MCNC: 8.2 MG/DL (ref 8.5–10.1)
CHLORIDE SERPL-SCNC: 106 MMOL/L (ref 99–107)
CO2 SERPL-SCNC: 23.6 MMOL/L (ref 24–32)
CREAT CL PREDICTED SERPL C-G-VRATE: 83 ML/MIN
CREAT SERPL-MCNC: 0.5 MG/DL (ref 0.4–0.9)
EOSINOPHIL # BLD AUTO: 0 X10'3 (ref 0–0.9)
EOSINOPHIL NFR BLD AUTO: 0.6 % (ref 0–6)
ERYTHROCYTE [DISTWIDTH] IN BLOOD BY AUTOMATED COUNT: 19.6 % (ref 11.5–14.5)
GFR SERPL CREATININE-BSD FRML MDRD: > 90 ML/MIN
GLOBULIN SER CALC-MCNC: 3.2 G/DL (ref 2.7–4.3)
GLUCOSE SERPL-MCNC: 222 MG/DL (ref 70–104)
HBA1C MFR BLD: 6.2 % (ref 4.5–6.2)
HCT VFR BLD AUTO: 29 % (ref 35–45)
HGB BLD-MCNC: 9.1 G/DL (ref 12–16)
LYMPHOCYTES # BLD AUTO: 0.3 X10'3 (ref 1.1–4.8)
LYMPHOCYTES NFR BLD AUTO: 6.3 % (ref 21–51)
MCH RBC QN AUTO: 22.4 PG (ref 27–31)
MCHC RBC AUTO-ENTMCNC: 31.6 G/DL (ref 33–36.5)
MCV RBC AUTO: 70.9 FL (ref 78–98)
MONOCYTES # BLD AUTO: 0.4 X10'3 (ref 0–0.9)
MONOCYTES NFR BLD AUTO: 9 % (ref 2–12)
NEUTROPHILS # BLD AUTO: 3.8 X10'3 (ref 1.8–7.7)
NEUTROPHILS NFR BLD AUTO: 84 % (ref 42–75)
PHOSPHATE SERPL-MCNC: 2.8 MG/DL (ref 2.3–4.5)
PLATELET # BLD AUTO: 73 X10'3 (ref 140–440)
PMV BLD AUTO: 9.1 FL (ref 7.4–10.4)
POTASSIUM SERPL-SCNC: 2.7 MMOL/L (ref 3.5–5.1)
PROT SERPL-MCNC: 6.1 G/DL (ref 6.4–8.2)
RBC # BLD AUTO: 4.09 X10'6 (ref 4.2–5.6)
SODIUM SERPL-SCNC: 141 MMOL/L (ref 135–145)
WBC # BLD AUTO: 4.5 X10'3 (ref 4.5–11)

## 2023-09-01 RX ADMIN — INSULIN LISPRO SCH UNITS: 100 INJECTION, SOLUTION INTRAVENOUS; SUBCUTANEOUS at 19:05

## 2023-09-01 RX ADMIN — INSULIN LISPRO SCH UNITS: 100 INJECTION, SOLUTION INTRAVENOUS; SUBCUTANEOUS at 21:36

## 2023-09-01 RX ADMIN — POTASSIUM CHLORIDE PRN MEQ: 1500 TABLET, EXTENDED RELEASE ORAL at 14:52

## 2023-09-01 RX ADMIN — POTASSIUM CHLORIDE PRN MEQ: 1500 TABLET, EXTENDED RELEASE ORAL at 09:40

## 2023-09-01 RX ADMIN — DOCUSATE SODIUM SCH MG: 100 CAPSULE, LIQUID FILLED ORAL at 08:00

## 2023-09-01 RX ADMIN — SODIUM CHLORIDE AND POTASSIUM CHLORIDE SCH MLS/HR: .9; .15 SOLUTION INTRAVENOUS at 20:37

## 2023-09-01 RX ADMIN — VANCOMYCIN HYDROCHLORIDE SCH MG: 500 INJECTION, POWDER, LYOPHILIZED, FOR SOLUTION INTRAVENOUS at 02:11

## 2023-09-01 RX ADMIN — VANCOMYCIN HYDROCHLORIDE SCH MG: 500 INJECTION, POWDER, LYOPHILIZED, FOR SOLUTION INTRAVENOUS at 15:59

## 2023-09-01 RX ADMIN — Medication SCH UNIT: at 08:00

## 2023-09-01 RX ADMIN — VANCOMYCIN HYDROCHLORIDE SCH MG: 500 INJECTION, POWDER, LYOPHILIZED, FOR SOLUTION INTRAVENOUS at 20:37

## 2023-09-01 RX ADMIN — SODIUM CHLORIDE AND POTASSIUM CHLORIDE SCH MLS/HR: .9; .15 SOLUTION INTRAVENOUS at 05:38

## 2023-09-01 RX ADMIN — VANCOMYCIN HYDROCHLORIDE SCH MG: 500 INJECTION, POWDER, LYOPHILIZED, FOR SOLUTION INTRAVENOUS at 08:08

## 2023-09-01 RX ADMIN — INSULIN LISPRO SCH UNITS: 100 INJECTION, SOLUTION INTRAVENOUS; SUBCUTANEOUS at 15:00

## 2023-09-01 RX ADMIN — POTASSIUM CHLORIDE PRN MEQ: 1500 TABLET, EXTENDED RELEASE ORAL at 21:33

## 2023-09-01 RX ADMIN — Medication SCH UNIT: at 08:08

## 2023-09-01 NOTE — NUR
Paged Dr. Kam

PAGER ID: [phone]

MESSAGE: paula Whiteside RN RE: Brandy Bowie. Pt blood sugar 201 this am and now its 
249. She got Lantus last night, no order for Humalog for hyperglycemia 
protocol. Can we put her on hyperglycemia/hypoglycemia protocol?

## 2023-09-01 NOTE — NUR
Patient in room ORTHO 4016. I have received report from MORE Giron and had the opportunity to 
ask questions and assume patient care.

## 2023-09-01 NOTE — NUR
Paged Dr. Kam

PAGER ID: [phone]

MESSAGE: GEORGI Whiteside RN RE: Brandy Bowie. Reporting critical lab K 2.7. Will 
replace per protocol. She also refused her Eliquis and Lasix this am

## 2023-09-02 VITALS
RESPIRATION RATE: 17 BRPM | TEMPERATURE: 97.5 F | OXYGEN SATURATION: 98 % | SYSTOLIC BLOOD PRESSURE: 131 MMHG | HEART RATE: 83 BPM | DIASTOLIC BLOOD PRESSURE: 43 MMHG

## 2023-09-02 VITALS
RESPIRATION RATE: 18 BRPM | HEART RATE: 92 BPM | SYSTOLIC BLOOD PRESSURE: 119 MMHG | DIASTOLIC BLOOD PRESSURE: 147 MMHG | OXYGEN SATURATION: 98 % | TEMPERATURE: 98 F

## 2023-09-02 VITALS — RESPIRATION RATE: 17 BRPM | OXYGEN SATURATION: 98 %

## 2023-09-02 LAB
ALBUMIN SERPL BCP-MCNC: 2.6 G/DL (ref 3.4–5)
ALBUMIN/GLOB SERPL: 0.8 {RATIO} (ref 1.1–1.5)
ALP SERPL-CCNC: 83 IU/L (ref 46–116)
ALT SERPL W P-5'-P-CCNC: 17 U/L (ref 12–78)
ANION GAP SERPL CALCULATED.3IONS-SCNC: 10 MMOL/L (ref 8–16)
ANISOCYTOSIS BLD QL SMEAR: (no result)
AST SERPL W P-5'-P-CCNC: 22 U/L (ref 10–37)
BASOPHILS # BLD AUTO: 0 X10'3 (ref 0–0.2)
BASOPHILS NFR BLD AUTO: 0.2 % (ref 0–1)
BILIRUB SERPL-MCNC: 0.7 MG/DL (ref 0.1–1)
BUN SERPL-MCNC: 12 MG/DL (ref 7–18)
BUN/CREAT SERPL: 26.1 (ref 10–20)
CALCIUM SERPL-MCNC: 8.2 MG/DL (ref 8.5–10.1)
CHLORIDE SERPL-SCNC: 110 MMOL/L (ref 99–107)
CO2 SERPL-SCNC: 23.3 MMOL/L (ref 24–32)
CREAT CL PREDICTED SERPL C-G-VRATE: 90 ML/MIN
CREAT SERPL-MCNC: 0.46 MG/DL (ref 0.4–0.9)
EOSINOPHIL # BLD AUTO: 0.1 X10'3 (ref 0–0.9)
EOSINOPHIL NFR BLD AUTO: 2.5 % (ref 0–6)
ERYTHROCYTE [DISTWIDTH] IN BLOOD BY AUTOMATED COUNT: 19.5 % (ref 11.5–14.5)
GFR SERPL CREATININE-BSD FRML MDRD: > 90 ML/MIN
GLOBULIN SER CALC-MCNC: 3.1 G/DL (ref 2.7–4.3)
GLUCOSE SERPL-MCNC: 194 MG/DL (ref 70–104)
HCT VFR BLD AUTO: 29.1 % (ref 35–45)
HGB BLD-MCNC: 9.1 G/DL (ref 12–16)
LYMPHOCYTES # BLD AUTO: 0.5 X10'3 (ref 1.1–4.8)
LYMPHOCYTES NFR BLD AUTO: 10.6 % (ref 21–51)
MCH RBC QN AUTO: 22.2 PG (ref 27–31)
MCHC RBC AUTO-ENTMCNC: 31.3 G/DL (ref 33–36.5)
MCV RBC AUTO: 71 FL (ref 78–98)
MICROCYTES BLD QL SMEAR: (no result)
MONOCYTES # BLD AUTO: 0.4 X10'3 (ref 0–0.9)
MONOCYTES NFR BLD AUTO: 8.8 % (ref 2–12)
NEUTROPHILS # BLD AUTO: 3.4 X10'3 (ref 1.8–7.7)
NEUTROPHILS NFR BLD AUTO: 77.9 % (ref 42–75)
OVALOCYTES BLD QL SMEAR: (no result)
PHOSPHATE SERPL-MCNC: 2.8 MG/DL (ref 2.3–4.5)
PLATELET # BLD AUTO: 80 X10'3 (ref 140–440)
PLATELET BLD QL SMEAR: (no result)
PMV BLD AUTO: 9 FL (ref 7.4–10.4)
POIKILOCYTOSIS BLD QL SMEAR: (no result)
POTASSIUM SERPL-SCNC: 3.8 MMOL/L (ref 3.5–5.1)
PROT SERPL-MCNC: 5.7 G/DL (ref 6.4–8.2)
RBC # BLD AUTO: 4.1 X10'6 (ref 4.2–5.6)
RBC MORPH BLD: (no result)
SODIUM SERPL-SCNC: 143 MMOL/L (ref 135–145)
WBC # BLD AUTO: 4.4 X10'3 (ref 4.5–11)

## 2023-09-02 RX ADMIN — POTASSIUM CHLORIDE PRN MEQ: 1500 TABLET, EXTENDED RELEASE ORAL at 05:41

## 2023-09-02 RX ADMIN — VANCOMYCIN HYDROCHLORIDE SCH MG: 500 INJECTION, POWDER, LYOPHILIZED, FOR SOLUTION INTRAVENOUS at 01:45

## 2023-09-02 RX ADMIN — POTASSIUM CHLORIDE PRN MEQ: 1500 TABLET, EXTENDED RELEASE ORAL at 01:45

## 2023-09-02 RX ADMIN — VANCOMYCIN HYDROCHLORIDE SCH MG: 500 INJECTION, POWDER, LYOPHILIZED, FOR SOLUTION INTRAVENOUS at 08:22

## 2023-09-02 RX ADMIN — Medication SCH UNIT: at 08:23

## 2023-09-02 RX ADMIN — SODIUM CHLORIDE AND POTASSIUM CHLORIDE SCH MLS/HR: .9; .15 SOLUTION INTRAVENOUS at 05:43

## 2023-09-02 RX ADMIN — Medication SCH UNIT: at 08:00

## 2023-09-02 NOTE — NUR
LVN documentation:

I have reviewed and agree with all interventions, assessments performed and documented by 
Jeff Aparicio LVN .

## 2023-09-02 NOTE — NUR
Problems reprioritized. Patient report given, questions answered & plan of care reviewed 
with azra MORALES.